# Patient Record
Sex: FEMALE | Race: WHITE | Employment: FULL TIME
[De-identification: names, ages, dates, MRNs, and addresses within clinical notes are randomized per-mention and may not be internally consistent; named-entity substitution may affect disease eponyms.]

---

## 2017-09-17 ENCOUNTER — HEALTH MAINTENANCE LETTER (OUTPATIENT)
Age: 32
End: 2017-09-17

## 2018-05-21 LAB
ABO + RH BLD: NORMAL
ABO + RH BLD: NORMAL
BLD GP AB SCN SERPL QL: NEGATIVE
C TRACH DNA SPEC QL PROBE+SIG AMP: NEGATIVE
HBV SURFACE AG SERPL QL IA: NEGATIVE
HIV 1+2 AB+HIV1 P24 AG SERPL QL IA: NEGATIVE
N GONORRHOEA DNA SPEC QL PROBE+SIG AMP: NEGATIVE
RUBELLA ANTIBODY IGG QUANTITATIVE: NORMAL IU/ML

## 2018-12-10 LAB — GROUP B STREP PCR: NEGATIVE

## 2019-01-04 ENCOUNTER — HOSPITAL ENCOUNTER (INPATIENT)
Facility: CLINIC | Age: 34
LOS: 1 days | Discharge: HOME | End: 2019-01-05
Attending: OBSTETRICS & GYNECOLOGY | Admitting: OBSTETRICS & GYNECOLOGY
Payer: COMMERCIAL

## 2019-01-04 LAB
ABO + RH BLD: NORMAL
ABO + RH BLD: NORMAL
SPECIMEN EXP DATE BLD: NORMAL
T PALLIDUM AB SER QL: NONREACTIVE

## 2019-01-04 PROCEDURE — 86900 BLOOD TYPING SEROLOGIC ABO: CPT | Performed by: OBSTETRICS & GYNECOLOGY

## 2019-01-04 PROCEDURE — 25000128 H RX IP 250 OP 636: Performed by: OBSTETRICS & GYNECOLOGY

## 2019-01-04 PROCEDURE — 86780 TREPONEMA PALLIDUM: CPT | Performed by: OBSTETRICS & GYNECOLOGY

## 2019-01-04 PROCEDURE — 86901 BLOOD TYPING SEROLOGIC RH(D): CPT | Performed by: OBSTETRICS & GYNECOLOGY

## 2019-01-04 PROCEDURE — 25000125 ZZHC RX 250: Performed by: OBSTETRICS & GYNECOLOGY

## 2019-01-04 PROCEDURE — 12000035 ZZH R&B POSTPARTUM

## 2019-01-04 PROCEDURE — G0463 HOSPITAL OUTPT CLINIC VISIT: HCPCS

## 2019-01-04 PROCEDURE — 72200001 ZZH LABOR CARE VAGINAL DELIVERY SINGLE

## 2019-01-04 PROCEDURE — 36415 COLL VENOUS BLD VENIPUNCTURE: CPT | Performed by: OBSTETRICS & GYNECOLOGY

## 2019-01-04 PROCEDURE — 0KQM0ZZ REPAIR PERINEUM MUSCLE, OPEN APPROACH: ICD-10-PCS | Performed by: OBSTETRICS & GYNECOLOGY

## 2019-01-04 PROCEDURE — 25000132 ZZH RX MED GY IP 250 OP 250 PS 637: Performed by: OBSTETRICS & GYNECOLOGY

## 2019-01-04 RX ORDER — SODIUM CHLORIDE, SODIUM LACTATE, POTASSIUM CHLORIDE, CALCIUM CHLORIDE 600; 310; 30; 20 MG/100ML; MG/100ML; MG/100ML; MG/100ML
INJECTION, SOLUTION INTRAVENOUS CONTINUOUS
Status: DISCONTINUED | OUTPATIENT
Start: 2019-01-04 | End: 2019-01-05 | Stop reason: HOSPADM

## 2019-01-04 RX ORDER — ONDANSETRON 2 MG/ML
4 INJECTION INTRAMUSCULAR; INTRAVENOUS EVERY 6 HOURS PRN
Status: DISCONTINUED | OUTPATIENT
Start: 2019-01-04 | End: 2019-01-05 | Stop reason: HOSPADM

## 2019-01-04 RX ORDER — OXYTOCIN 10 [USP'U]/ML
10 INJECTION, SOLUTION INTRAMUSCULAR; INTRAVENOUS
Status: DISCONTINUED | OUTPATIENT
Start: 2019-01-04 | End: 2019-01-05 | Stop reason: HOSPADM

## 2019-01-04 RX ORDER — AMOXICILLIN 250 MG
1 CAPSULE ORAL 2 TIMES DAILY
Status: DISCONTINUED | OUTPATIENT
Start: 2019-01-04 | End: 2019-01-05 | Stop reason: HOSPADM

## 2019-01-04 RX ORDER — OXYTOCIN/0.9 % SODIUM CHLORIDE 30/500 ML
340 PLASTIC BAG, INJECTION (ML) INTRAVENOUS CONTINUOUS PRN
Status: DISCONTINUED | OUTPATIENT
Start: 2019-01-04 | End: 2019-01-05 | Stop reason: HOSPADM

## 2019-01-04 RX ORDER — OXYTOCIN/0.9 % SODIUM CHLORIDE 30/500 ML
100-340 PLASTIC BAG, INJECTION (ML) INTRAVENOUS CONTINUOUS PRN
Status: COMPLETED | OUTPATIENT
Start: 2019-01-04 | End: 2019-01-04

## 2019-01-04 RX ORDER — OXYTOCIN/0.9 % SODIUM CHLORIDE 30/500 ML
100 PLASTIC BAG, INJECTION (ML) INTRAVENOUS CONTINUOUS
Status: DISCONTINUED | OUTPATIENT
Start: 2019-01-04 | End: 2019-01-05 | Stop reason: HOSPADM

## 2019-01-04 RX ORDER — OXYCODONE AND ACETAMINOPHEN 5; 325 MG/1; MG/1
1 TABLET ORAL
Status: DISCONTINUED | OUTPATIENT
Start: 2019-01-04 | End: 2019-01-05 | Stop reason: HOSPADM

## 2019-01-04 RX ORDER — LANOLIN 100 %
OINTMENT (GRAM) TOPICAL
Status: DISCONTINUED | OUTPATIENT
Start: 2019-01-04 | End: 2019-01-05 | Stop reason: HOSPADM

## 2019-01-04 RX ORDER — ACETAMINOPHEN 325 MG/1
650 TABLET ORAL EVERY 4 HOURS PRN
Status: DISCONTINUED | OUTPATIENT
Start: 2019-01-04 | End: 2019-01-05 | Stop reason: HOSPADM

## 2019-01-04 RX ORDER — OXYCODONE HYDROCHLORIDE 5 MG/1
5 TABLET ORAL EVERY 4 HOURS PRN
Status: DISCONTINUED | OUTPATIENT
Start: 2019-01-04 | End: 2019-01-05 | Stop reason: HOSPADM

## 2019-01-04 RX ORDER — IBUPROFEN 400 MG/1
800 TABLET, FILM COATED ORAL EVERY 6 HOURS PRN
Status: DISCONTINUED | OUTPATIENT
Start: 2019-01-04 | End: 2019-01-05 | Stop reason: HOSPADM

## 2019-01-04 RX ORDER — IBUPROFEN 400 MG/1
800 TABLET, FILM COATED ORAL
Status: DISCONTINUED | OUTPATIENT
Start: 2019-01-04 | End: 2019-01-05 | Stop reason: HOSPADM

## 2019-01-04 RX ORDER — CARBOPROST TROMETHAMINE 250 UG/ML
250 INJECTION, SOLUTION INTRAMUSCULAR
Status: DISCONTINUED | OUTPATIENT
Start: 2019-01-04 | End: 2019-01-05 | Stop reason: HOSPADM

## 2019-01-04 RX ORDER — AMOXICILLIN 250 MG
2 CAPSULE ORAL 2 TIMES DAILY
Status: DISCONTINUED | OUTPATIENT
Start: 2019-01-04 | End: 2019-01-05 | Stop reason: HOSPADM

## 2019-01-04 RX ORDER — BISACODYL 10 MG
10 SUPPOSITORY, RECTAL RECTAL DAILY PRN
Status: DISCONTINUED | OUTPATIENT
Start: 2019-01-06 | End: 2019-01-05 | Stop reason: HOSPADM

## 2019-01-04 RX ORDER — METHYLERGONOVINE MALEATE 0.2 MG/ML
200 INJECTION INTRAVENOUS
Status: DISCONTINUED | OUTPATIENT
Start: 2019-01-04 | End: 2019-01-05 | Stop reason: HOSPADM

## 2019-01-04 RX ORDER — HYDROCORTISONE 2.5 %
CREAM (GRAM) TOPICAL 3 TIMES DAILY PRN
Status: DISCONTINUED | OUTPATIENT
Start: 2019-01-04 | End: 2019-01-05 | Stop reason: HOSPADM

## 2019-01-04 RX ORDER — NALOXONE HYDROCHLORIDE 0.4 MG/ML
.1-.4 INJECTION, SOLUTION INTRAMUSCULAR; INTRAVENOUS; SUBCUTANEOUS
Status: DISCONTINUED | OUTPATIENT
Start: 2019-01-04 | End: 2019-01-05 | Stop reason: HOSPADM

## 2019-01-04 RX ADMIN — IBUPROFEN 800 MG: 400 TABLET ORAL at 18:29

## 2019-01-04 RX ADMIN — IBUPROFEN 800 MG: 400 TABLET ORAL at 05:45

## 2019-01-04 RX ADMIN — OXYTOCIN-SODIUM CHLORIDE 0.9% IV SOLN 30 UNIT/500ML 340 ML/HR: 30-0.9/5 SOLUTION at 04:33

## 2019-01-04 RX ADMIN — IBUPROFEN 800 MG: 400 TABLET ORAL at 12:38

## 2019-01-04 RX ADMIN — SENNOSIDES AND DOCUSATE SODIUM 1 TABLET: 8.6; 5 TABLET ORAL at 21:35

## 2019-01-04 RX ADMIN — ACETAMINOPHEN 650 MG: 325 TABLET, FILM COATED ORAL at 05:45

## 2019-01-04 RX ADMIN — SENNOSIDES AND DOCUSATE SODIUM 1 TABLET: 8.6; 5 TABLET ORAL at 12:38

## 2019-01-04 RX ADMIN — ACETAMINOPHEN 650 MG: 325 TABLET, FILM COATED ORAL at 18:29

## 2019-01-04 RX ADMIN — SODIUM CHLORIDE, POTASSIUM CHLORIDE, SODIUM LACTATE AND CALCIUM CHLORIDE 1000 ML: 600; 310; 30; 20 INJECTION, SOLUTION INTRAVENOUS at 04:15

## 2019-01-04 RX ADMIN — LIDOCAINE HYDROCHLORIDE 17 ML: 10 INJECTION, SOLUTION INFILTRATION; PERINEURAL at 04:45

## 2019-01-04 RX ADMIN — ACETAMINOPHEN 650 MG: 325 TABLET, FILM COATED ORAL at 12:38

## 2019-01-04 ASSESSMENT — MIFFLIN-ST. JEOR: SCORE: 1459.31

## 2019-01-04 NOTE — PLAN OF CARE
Pt presents to [facility] ambulatory accompanied by spouse. 39w3d. . Here for labor assessment.  Pt states cx's began at approximately midnight, but became stronger and more frequent at 0215.  Monitors applied. VSS. Pt denies any significant medical history during pregnancy or otherwise.    0405-Pt appears extremely uncomfortable with contractions and states she's having pressure.  SVE 8//-1.  Intact.    0410-Transfer pt out to  218 via bed.    Page sent to Dr Mchugh x2 once at 0411 and once at 0414    Phone call to Dr Mchugh x2 shortly thereafter with answer during second call.  Request MD to come for delivery.  SVE 9cm

## 2019-01-04 NOTE — PLAN OF CARE
Pt. admitted from L&D via wheelchair. Pt. arrived with baby and was accompanied by her SO and arrived with personal belongings. Report was taken from , RN and bands verified.  Pt. VSS. Fundus is firm and midline.  Vaginal bleeding is WNL.  IV patent and infusing.  Pt. oriented to the room and call light system.  Safety points reviewed.

## 2019-01-04 NOTE — PROCEDURES
Delivery note  (In House MD)    Called to attend delivery  33 year old para 2  Spontaneous rapid labor     @ 0426  2nd stage <5 minutes    Female  Weight pending  apgars 9-9    Placenta spontaneous, intact, 3vc    EBL ~200cc    Small second degree perineal laceration  (Dr Mchugh arrived within the past 10 minutes, and will perform repair)    Zechariah Yun MD

## 2019-01-04 NOTE — PLAN OF CARE
0412-Pt moved to 218 in bed.  Orientated to room.   0413-EUM/US applied.  Pt requesting epidural for labor pain.  0415-IV started.  Pt feeling pressure.  SVE-9 cm.  0420-Dr. Mchugh called updated on pt status and to come for delivery.  Pt unmedicated at this time.  SROM-clear fluid.  SVE-Complete.  In-House called for delivery.  0424-Dr. Yun at bedside update given.  0426- baby girl.  Apgar's 9 and 9.  0433-Delivery intact placenta.  Pitocin started per protocol.  044-Dr. Mchugh at bedside.  Update given.  Repair per MD.  See Delivery summary for more details.

## 2019-01-04 NOTE — H&P
"OB Brief Admit H&P    No significant change in general health status based on examination of the patient, review of Nursing Admission Database and prenatal record.    Pt is a 33 year old  @ 39w3d who presented to L&D with regular uterine contractions.  Contractions began around 0100, initially every 6-8 minutes, then increasing to every 2-3 minutes. No leaking fluid prior to presentation..    Patient's prenatal course has been complicated by mild iron deficiency anemia; low lying placenta - resolved    Prenatal Labs:    Blood type A+  Rubella immune  GCT 94  GBS negative 12/10/18    EFW: 7.5 lbs    /74   Temp 98.2  F (36.8  C) (Temporal)   Resp 16   Ht 1.702 m (5' 7\")   Wt 71.7 kg (158 lb)   Breastfeeding? Unknown   BMI 24.75 kg/m    EFM:  130, moderate variability, ++accels, no decels  [place]: Q4-6 min  SVE: 8/90%/100  Membranes:  Intact on admission, SROM shortly thereafter    Assessment:  33 year old  @ 39w3d admitted for active labor.    Plan:  1. Admit to labor and delivery   2. In-house available to attend precipitous  while I was en route to hospital.  3. S/p Tdap & flu this pregnancy.    Aster Mchugh MD  2019  5:03 AM      "

## 2019-01-04 NOTE — PLAN OF CARE
Fundus remained firm U/1.  Bleeding has been light.  Tylenol and ibuprofen given for cramping.  Breastfeeding well.  PIV is infusing.  Spouse at bedside.  Encouraged to call with questions.

## 2019-01-05 VITALS
SYSTOLIC BLOOD PRESSURE: 110 MMHG | WEIGHT: 158 LBS | HEART RATE: 66 BPM | DIASTOLIC BLOOD PRESSURE: 72 MMHG | RESPIRATION RATE: 16 BRPM | BODY MASS INDEX: 24.8 KG/M2 | HEIGHT: 67 IN | TEMPERATURE: 98.1 F

## 2019-01-05 LAB — HGB BLD-MCNC: 9.7 G/DL (ref 11.7–15.7)

## 2019-01-05 PROCEDURE — 85018 HEMOGLOBIN: CPT | Performed by: OBSTETRICS & GYNECOLOGY

## 2019-01-05 PROCEDURE — 25000132 ZZH RX MED GY IP 250 OP 250 PS 637: Performed by: OBSTETRICS & GYNECOLOGY

## 2019-01-05 PROCEDURE — 36415 COLL VENOUS BLD VENIPUNCTURE: CPT | Performed by: OBSTETRICS & GYNECOLOGY

## 2019-01-05 RX ADMIN — SENNOSIDES AND DOCUSATE SODIUM 2 TABLET: 8.6; 5 TABLET ORAL at 08:31

## 2019-01-05 RX ADMIN — ACETAMINOPHEN 650 MG: 325 TABLET, FILM COATED ORAL at 00:51

## 2019-01-05 RX ADMIN — IBUPROFEN 800 MG: 400 TABLET ORAL at 08:31

## 2019-01-05 RX ADMIN — ACETAMINOPHEN 650 MG: 325 TABLET, FILM COATED ORAL at 08:30

## 2019-01-05 RX ADMIN — IBUPROFEN 800 MG: 400 TABLET ORAL at 00:51

## 2019-01-05 NOTE — LACTATION NOTE
Initial Lactation visit. Recommend unlimited, frequent breast feedings:at least 8 - 12 times every 24 hours.  Avoid pacifiers and supplementation with formula unless medically indicated.  Explained benefits of holding baby skin on skin to help promote better breastfeeding outcomes.Observed good latch and rhythmic suckle. Experienced mom has no questions at present. Will continue to follow as needed. N Day RN

## 2019-01-05 NOTE — PLAN OF CARE
VSS. Fundus firm, scant flow. Pain controlled. Understands all discharge instructions. Discharged home with baby.

## 2019-01-05 NOTE — PLAN OF CARE
Breast feeding is going well, up to the bathroom independently,  taking Tylenol  and Ibuprofen for pain control.

## 2019-01-05 NOTE — PLAN OF CARE
Patient meeting expected goals for this shift.  Using ibuprofen & tylenol for pain/comfort.  Independent in all self and  cares.  Working on breastfeeding .   present at bedside and supportive.  Positive bonding behaviors observed.  Continue to monitor and notify MD as needed.

## 2019-01-05 NOTE — DISCHARGE SUMMARY
"OB Post-partum Note  PPD# 1    S:  Patient doing well.  Pain controlled.  Voiding.  Bleeding light.  Breast feeding well. Ready to go home, good family support. Denies HA, SOB, dizziness    O:  /72   Pulse 66   Temp 98.1  F (36.7  C) (Oral)   Resp 16   Ht 1.702 m (5' 7\")   Wt 71.7 kg (158 lb)   Breastfeeding? Unknown   BMI 24.75 kg/m    Gen- A&O, NAD  Abd- Non-tender, fundus firm at umbilicus  Ext- non-tender, neg edema    Hemoglobin   Date Value Ref Range Status   2019 9.7 (L) 11.7 - 15.7 g/dL Final     A+  Rubella Immune    A/P: 33 year old  PPD# 1 s/p , breastfeeding    1.  Post partum instructions, warnings/precautions reviewed.  2.  OTC analgesia for discomfort, iron supplement daily  3.  plans vasectomy/condoms BCM  4.  RTC 6 weeks/prn    Nazia Billy CNM  2019  11:17 AM  "

## 2019-01-14 NOTE — L&D DELIVERY NOTE
Delivery Date:  2019      This is a spontaneous vaginal delivery 2019 and briefly our delivery is as follows:       This patient is a 33-year-old  woman, para 2, who presented to [facility] in the early morning hours of 2019, at term gestation in labor.  I was the in-house obstetrical physician.  She labored rapidly, and I was called to attend her delivery.  The patient pushed effectively.  A second stage of less than 5 minutes effected a spontaneous vaginal delivery at 4:26 in the morning on 2019.  As the mouth and nares appeared, they were bulb suctioned.  The rest of the infant was delivered and handed to the mother.  It was a female infant, 8 pounds 2 ounces, Apgars 9 and 9 at 1 and 5 minutes respectively.  Several minutes later, the cord was doubly clamped and cut.  The placenta delivered spontaneously intact.  It was found to have a 3-vessel cord.  The uterus was firming adequately with manual massage.  The patient's bleeding was minimal at this time.  Estimated blood loss for delivery approximately 200 mL.  Dr. Aster Mchugh, patient's on-call obstetrician arrived, and she was to perform repair of the patient's small second-degree perineal laceration.      SUMMARY:  Dr. Saira Quintero as in-house obstetrical physician, spontaneous vaginal delivery 8 pound 2 ounce female infant for Apple Das, 2019.         SAIRA QUINTERO MD             D: 2019   T: 2019   MT: SLY      Name:     APPLE DAS   MRN:      [id number]        Account:        [id number]   :      1985        Delivery Date:  2019               Document: [id number]

## 2019-11-08 ENCOUNTER — HEALTH MAINTENANCE LETTER (OUTPATIENT)
Age: 34
End: 2019-11-08

## 2020-02-23 ENCOUNTER — HEALTH MAINTENANCE LETTER (OUTPATIENT)
Age: 35
End: 2020-02-23

## 2020-12-06 ENCOUNTER — HEALTH MAINTENANCE LETTER (OUTPATIENT)
Age: 35
End: 2020-12-06

## 2021-09-26 ENCOUNTER — HEALTH MAINTENANCE LETTER (OUTPATIENT)
Age: 36
End: 2021-09-26

## 2021-12-28 ENCOUNTER — APPOINTMENT (OUTPATIENT)
Dept: URGENT CARE | Facility: URGENT CARE | Age: 36
End: 2021-12-28
Payer: COMMERCIAL

## 2021-12-28 ENCOUNTER — LAB REQUISITION (OUTPATIENT)
Dept: LAB | Facility: CLINIC | Age: 36
End: 2021-12-28

## 2021-12-28 PROCEDURE — U0005 INFEC AGEN DETEC AMPLI PROBE: HCPCS | Performed by: INTERNAL MEDICINE

## 2021-12-29 LAB — SARS-COV-2 RNA RESP QL NAA+PROBE: POSITIVE

## 2022-01-15 ENCOUNTER — HEALTH MAINTENANCE LETTER (OUTPATIENT)
Age: 37
End: 2022-01-15

## 2022-11-17 ENCOUNTER — LAB REQUISITION (OUTPATIENT)
Dept: LAB | Facility: CLINIC | Age: 37
End: 2022-11-17

## 2022-11-17 DIAGNOSIS — Z12.4 ENCOUNTER FOR SCREENING FOR MALIGNANT NEOPLASM OF CERVIX: ICD-10-CM

## 2022-11-17 PROCEDURE — 87624 HPV HI-RISK TYP POOLED RSLT: CPT | Performed by: OBSTETRICS & GYNECOLOGY

## 2022-11-17 PROCEDURE — G0145 SCR C/V CYTO,THINLAYER,RESCR: HCPCS | Performed by: OBSTETRICS & GYNECOLOGY

## 2022-11-21 LAB
BKR LAB AP GYN ADEQUACY: NORMAL
BKR LAB AP GYN INTERPRETATION: NORMAL
BKR LAB AP HPV REFLEX: NORMAL
BKR LAB AP LMP: NORMAL
BKR LAB AP PREVIOUS ABNL DX: NORMAL
BKR LAB AP PREVIOUS ABNORMAL: NORMAL
PATH REPORT.COMMENTS IMP SPEC: NORMAL
PATH REPORT.COMMENTS IMP SPEC: NORMAL
PATH REPORT.RELEVANT HX SPEC: NORMAL

## 2022-11-23 LAB
HUMAN PAPILLOMA VIRUS 16 DNA: NEGATIVE
HUMAN PAPILLOMA VIRUS 18 DNA: NEGATIVE
HUMAN PAPILLOMA VIRUS FINAL DIAGNOSIS: NORMAL
HUMAN PAPILLOMA VIRUS OTHER HR: NEGATIVE

## 2023-04-22 ENCOUNTER — HEALTH MAINTENANCE LETTER (OUTPATIENT)
Age: 38
End: 2023-04-22

## 2024-06-29 ENCOUNTER — HEALTH MAINTENANCE LETTER (OUTPATIENT)
Age: 39
End: 2024-06-29